# Patient Record
Sex: FEMALE | Race: WHITE | NOT HISPANIC OR LATINO | Employment: STUDENT | URBAN - METROPOLITAN AREA
[De-identification: names, ages, dates, MRNs, and addresses within clinical notes are randomized per-mention and may not be internally consistent; named-entity substitution may affect disease eponyms.]

---
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
## 2023-01-03 ENCOUNTER — TELEPHONE (OUTPATIENT)
Dept: FAMILY MEDICINE | Facility: CLINIC | Age: 18
End: 2023-01-03

## 2023-01-03 ENCOUNTER — MYC MEDICAL ADVICE (OUTPATIENT)
Dept: FAMILY MEDICINE | Facility: CLINIC | Age: 18
End: 2023-01-03

## 2023-01-03 ENCOUNTER — VIRTUAL VISIT (OUTPATIENT)
Dept: FAMILY MEDICINE | Facility: CLINIC | Age: 18
End: 2023-01-03
Payer: COMMERCIAL

## 2023-01-03 DIAGNOSIS — R30.0 DYSURIA: ICD-10-CM

## 2023-01-03 DIAGNOSIS — R10.2 PELVIC PAIN IN FEMALE: Primary | ICD-10-CM

## 2023-01-03 PROCEDURE — 99213 OFFICE O/P EST LOW 20 MIN: CPT | Mod: 95 | Performed by: NURSE PRACTITIONER

## 2023-01-03 NOTE — TELEPHONE ENCOUNTER
See telephone encounter from today, patient has a virtual visit scheduled with PCP this afternoon. Closing this encounter.    Luli Go RN  [facility]

## 2023-01-03 NOTE — TELEPHONE ENCOUNTER
"I would consider a urine culture a 'false negative\" if someone had taken 1 day of antibiotic prior to collection.  This is a true negative urine culture.  I would not recommend repeating a urine test.  Recommend to follow-up with PCP if concerns still exist  "

## 2023-01-03 NOTE — PROGRESS NOTES
Cherelle is a 17 year old who is being evaluated via a billable video visit.      How would you like to obtain your AVS? MyChart  If the video visit is dropped, the invitation should be resent by: Text to cell phone: [phone]  Will anyone else be joining your video visit? No          Assessment & Plan     Pelvic pain in female  Patient complains of a few week history of painful urination, hesitancy with lower abdominal/pelvic pain.  Previous history of urinary tract infections, [place] a few weeks ago, however was stopped due to no growth on urine culture.  Seen again within our system, diagnosed with constipation and urinary tract infection, started on Macrobid.  Bowel movements are normal, still having some intermittent lower abdominal pain and minimal burning with urination.  Most recent urine culture did not grow any bacteria, negative for infection.  Recommend patient repeat urinalysis and wet prep, has lab appointment on Thursday.  Will call patient with results and recommendations.  In the interim, recommend increasing fluid intake and monitoring symptoms.  If patient continues to have symptoms, may refer to urology.  - Wet prep - lab collect; Future  - UA Macro with Reflex to Micro and Culture - lab collect; Future    Dysuria  Burning on urination as above.  - UA Macro with Reflex to Micro and Culture - lab collect; Future             See Patient Instructions    Return in about 1 week (around 1/10/2023), or if symptoms worsen or fail to improve.    Sonia Reinoso, JOE, APRN-CNP   [facility]    Emelina Villarreal is a 17 year old, presenting for the following health issues:  Urinary Problem      HPI     Genitourinary - Female  Onset/Duration: a few weeks - last week started feeling really gross again. Lower abdominal pain mid abdomen  Description:   Painful urination (Dysuria): YES           Frequency: No  Blood in urine (Hematuria): No  Delay  in urine (Hesitency): YES  Intensity: mild  Progression of Symptoms:  improving  Accompanying Signs & Symptoms:  Fever/chills: No  Flank pain: YES but is much better  Nausea and vomiting: No is better now  Vaginal symptoms: none  Abdominal/Pelvic Pain: YES  History:   History of frequent UTI s: YES-   History of kidney stones: No  Sexually Active: YES  Possibility of pregnancy: No  Precipitating or alleviating factors: None  Therapies tried and outcome:  took a couple of dose of bactrim a couple weeks ago, went to a clinic in [place] but a couple days later went in again and abx was stopped because no infection.   On 12/29/2022 went to [facility] dx with constipation and given Macrobid for UTI.  Is still on Macrobid  Did not start medication for her bowels, said her bowels are normal now    Now having normal bowel movement's   Still having lower abdominal pain at times   Back pain is gone and stomach problems are better  Gets labs rechecked on Thursday   Still some minimal burning with peeing     Review of Systems   Constitutional, HEENT, cardiovascular, pulmonary, gi and gu systems are negative, except as otherwise noted.      Objective           Vitals:  No vitals were obtained today due to virtual visit.    Physical Exam   GENERAL: Healthy, alert and no distress  EYES: Eyes grossly normal to inspection.  No discharge or erythema, or obvious scleral/conjunctival abnormalities.  RESP: No audible wheeze, cough, or visible cyanosis.  No visible retractions or increased work of breathing.    SKIN: Visible skin clear. No significant rash, abnormal pigmentation or lesions.  NEURO: Cranial nerves grossly intact.  Mentation and speech appropriate for age.  PSYCH: Mentation appears normal, affect normal/bright, judgement and insight intact, normal speech and appearance well-groomed.    Diagnostic Test Results:  Labs reviewed in Epic  Pending          Video-Visit Details    Type of service:  Video Visit     Originating  Location (pt. Location): Home    Distant Location (provider location):  Off-site  Platform used for Video Visit: [employer]      Chart documentation with Dragon Voice recognition Software. Although reviewed after completion, some words and grammatical errors may remain.

## 2023-01-03 NOTE — TELEPHONE ENCOUNTER
Reason for Call: Lab Questions     Detailed comments: Pt had some Labs done and now has questions on the Urine Results.     Phone Number Patient can be reached at: Home number on file [phone] (home)    Best Time: Any Time      Can we leave a detailed message on this number? YES    Call taken on 1/3/2023 at 9:03 AM by Rosie Mendiola

## 2023-01-03 NOTE — TELEPHONE ENCOUNTER
Pt was notified. Pt says that her symptoms have improved a little, but that she's still having burning with urination and doesn't understand why that would be if her UC is negative. Writer suggests that she schedule a f/u visit with her PCP. She says her PCP is Sonia Reinoso NP, says that she's going back to school in [place] this weekend and would like to figure this out before then if possible. Writer suggests that she schedule a virtual visit with PCP this afternoon to discuss further, and she's in agreement with this plan. Call transferred to central scheduling to make an appointment.    Luli Go RN  [facility]

## 2023-01-03 NOTE — TELEPHONE ENCOUNTER
Dr. Trevino,    Patient and her family are concerned that this is a false negative urine culture and would like to repeat the process again.  Please advise. Anna ADAMS RN

## 2023-01-03 NOTE — RESULT ENCOUNTER NOTE
Even though the initial urine was suggestive of an infection, no single bacteria grew in culture, meaning no infection was truly present.  Antibiotic course is complete, so no further action is needed

## 2023-01-05 ENCOUNTER — LAB (OUTPATIENT)
Dept: LAB | Facility: CLINIC | Age: 18
End: 2023-01-05
Payer: COMMERCIAL

## 2023-01-05 DIAGNOSIS — R17 ELEVATED BILIRUBIN: ICD-10-CM

## 2023-01-05 DIAGNOSIS — R10.2 PELVIC PAIN IN FEMALE: ICD-10-CM

## 2023-01-05 DIAGNOSIS — R30.0 DYSURIA: ICD-10-CM

## 2023-01-05 LAB
ALBUMIN SERPL BCG-MCNC: 4.5 G/DL (ref 3.5–5.2)
ALBUMIN UR-MCNC: NEGATIVE MG/DL
ALP SERPL-CCNC: 62 U/L (ref 35–104)
ALT SERPL W P-5'-P-CCNC: 10 U/L (ref 10–35)
APPEARANCE UR: CLEAR
AST SERPL W P-5'-P-CCNC: 14 U/L (ref 10–35)
BILIRUB DIRECT SERPL-MCNC: <0.2 MG/DL (ref 0–0.3)
BILIRUB SERPL-MCNC: 0.7 MG/DL
BILIRUB UR QL STRIP: NEGATIVE
CLUE CELLS: NORMAL
COLOR UR AUTO: YELLOW
GLUCOSE UR STRIP-MCNC: NEGATIVE MG/DL
HGB UR QL STRIP: NEGATIVE
KETONES UR STRIP-MCNC: NEGATIVE MG/DL
LEUKOCYTE ESTERASE UR QL STRIP: ABNORMAL
NITRATE UR QL: NEGATIVE
PH UR STRIP: 6.5 [PH] (ref 5–7)
PROT SERPL-MCNC: 6.9 G/DL (ref 6.4–8.3)
RBC #/AREA URNS AUTO: ABNORMAL /HPF
SP GR UR STRIP: 1.02 (ref 1–1.03)
SQUAMOUS #/AREA URNS AUTO: ABNORMAL /LPF
TRICHOMONAS, WET PREP: NORMAL
UROBILINOGEN UR STRIP-ACNC: 0.2 E.U./DL
WBC #/AREA URNS AUTO: ABNORMAL /HPF
WBC'S/HIGH POWER FIELD, WET PREP: NORMAL
YEAST, WET PREP: NORMAL

## 2023-01-05 PROCEDURE — 36415 COLL VENOUS BLD VENIPUNCTURE: CPT

## 2023-01-05 PROCEDURE — 81001 URINALYSIS AUTO W/SCOPE: CPT

## 2023-01-05 PROCEDURE — 87210 SMEAR WET MOUNT SALINE/INK: CPT

## 2023-01-05 PROCEDURE — 80076 HEPATIC FUNCTION PANEL: CPT

## 2023-01-08 ENCOUNTER — NURSE TRIAGE (OUTPATIENT)
Dept: NURSING | Facility: CLINIC | Age: 18
End: 2023-01-08

## 2023-01-08 NOTE — TELEPHONE ENCOUNTER
"Patient has pain in upper left abdomin area and some in the lower left region down in the pelvis.    Patient states she has been having some pain for the last few weeks.  She has had some blood tests and urine tests.    Patient states the pain isnt better.  Patient states it is not constant but pain is occurring more than it is not.  Patient rates pain 4/10.    Patient is not weak, no confusion, hasnt passed out, no vomiting, no blood in stool.    Today patient has been urinating more often than normal.  She has also had 4 BM that is more than normal.  Her BMs have been formed, no blood in them.  Patient states she has nausea, fatigue and dry mouth.    Care advise: keep hydrated, bland diet, no alcohol, no caffeine, no fatty or greasy foods.  Can try kaopectate or pepto bismil.  Call back if any severe pain.  No appointments in clinic, will route.    Shelley Michael RN   01/08/23 4:48 PM  [facility] Nurse Advisor    Reason for Disposition    [1] MODERATE pain (e.g., interferes with normal activities) AND [2] pain comes and goes (cramps) AND [3] present > 24 hours  (Exception: pain with Vomiting or Diarrhea - see that Guideline)    Additional Information    Negative: Shock suspected (e.g., cold/pale/clammy skin, too weak to stand, low BP, rapid pulse)    Negative: Difficult to awaken or acting confused (e.g., disoriented, slurred speech)    Negative: Passed out (i.e., lost consciousness, collapsed and was not responding)    Negative: Sounds like a life-threatening emergency to the triager    Negative: Chest pain    Negative: Pain is mainly in upper abdomen  (if needed ask: \"is it mainly above the belly button?\")    Negative: Followed an abdomen (stomach) injury    Negative: [1] Abdominal pain AND [2] pregnant < 20 weeks    Negative: [1] Abdominal pain AND [2] pregnant 20 or more weeks    Negative: [1] Abdominal pain AND [2] postpartum (from 0 to 6 weeks after delivery)    Negative: [1] SEVERE pain (e.g., " "excruciating) AND [2] present > 1 hour    Negative: [1] SEVERE pain AND [2] age > 60 years    Negative: [1] Vomiting AND [2] contains red blood or black (\"coffee ground\") material  (Exception: few red streaks in vomit that only happened once)    Negative: Blood in bowel movements (Exception: blood on surface of BM with constipation)    Negative: Black or tarry bowel movements (Exception: chronic-unchanged black-grey bowel movements AND is taking iron pills or Pepto-bismol)    Negative: Patient sounds very sick or weak to the triager    Negative: [1] MILD-MODERATE pain AND [2] constant AND [3] present > 2 hours    Negative: [1] Vomiting AND [2] abdomen looks much more swollen than usual    Negative: [1] Vomiting AND [2] contains bile (green color)    Negative: White of the eyes have turned yellow (i.e., jaundice)    Negative: Fever > 103 F (39.4 C)    Negative: [1] Fever > 101 F (38.3 C) AND [2] age > 60 years    Negative: [1] Fever > 100.0 F (37.8 C) AND [2] bedridden (e.g., nursing home patient, CVA, chronic illness, recovering from surgery)    Negative: [1] Fever > 100.0 F (37.8 C) AND [2] diabetes mellitus or weak immune system (e.g., HIV positive, cancer chemo, splenectomy, organ transplant, chronic steroids)    Negative: [1] SEVERE pain AND [2] present < 1 hour    Protocols used: ABDOMINAL PAIN - FEMALE-A-AH      "

## 2023-01-09 ENCOUNTER — TELEPHONE (OUTPATIENT)
Dept: FAMILY MEDICINE | Facility: CLINIC | Age: 18
End: 2023-01-09

## 2023-01-09 NOTE — TELEPHONE ENCOUNTER
Please notify patient her urine and wet prep were normal, no infections.Given symptoms she should be seen in Urgent Care.    Thanks,  Sonia Reinoso, DNP, APRN-CNP

## 2023-01-09 NOTE — TELEPHONE ENCOUNTER
Pt is calling   She is living in [place]  She was in the ER yesterday- They did not do a CT because it wasn't an emergency  She got a liter of fluids and zofran    Per pt the ER dr told her she needs an order from her provider in [place] to have a abdominal CT  Writer is not sure if we would be able to do that    Educated pt about when to seek ER care  Pt verbalized understanding and agrees to the plan    Routing to PCP     Thank you  Cesilia Henriquez RN on 1/9/2023 at 8:19 AM

## 2023-01-09 NOTE — TELEPHONE ENCOUNTER
Reason for Call:  Other prescription    Detailed comments: Pt was in the ER 1/8/23 for Abdominal Pain. Pt was given IV Fluids.  Told to follow  Up today with Provider.   Pt is feeling nauseated and left pain is still the pain.   Pt said she is feeling more sick as time goes by.  Transferred pt to [facility].      Phone Number Patient can be reached at: Home number on file [phone]   (home)    Best Time: Any Time      Can we leave a detailed message on this number? YES    Call taken on 1/9/2023 at 8:06 AM by Rosie Mendiola

## 2023-01-10 NOTE — TELEPHONE ENCOUNTER
FERN Scott    Pt called back as she still is having abdominal pain in the upper left quad of abdomen.  She says sometimes she has discomfort in her right sided abdomen.  She has nausea off and on.  She said no chance she could be pregnant. Last period was 12/20.  BM are brown and usually everyday.  She is drinking a lot of fluids.  She was in an ER In [place] on 1/8 where she goes to college.  She said they did not do any imaging on her abdomen.    Pt was advised to go back to the ER if she has abdominal pain.  She is establishing with a primary care provider on Tuesday , near [facility] , in [place]  .

## 2023-01-16 ENCOUNTER — MYC MEDICAL ADVICE (OUTPATIENT)
Dept: FAMILY MEDICINE | Facility: CLINIC | Age: 18
End: 2023-01-16
Payer: COMMERCIAL

## 2023-01-24 NOTE — PATIENT INSTRUCTIONS
Pelvic pain in female  Patient complains of a few week history of painful urination, hesitancy with lower abdominal/pelvic pain.  Previous history of urinary tract infections[place] antibiotic a few weeks ago, however was stopped due to no growth on urine culture.  Seen again within our system, diagnosed with constipation and urinary tract infection, started on Macrobid.  Bowel movements are normal, still having some intermittent lower abdominal pain and minimal burning with urination.  Most recent urine culture did not grow any bacteria, negative for infection.  Recommend patient repeat urinalysis and wet prep, has lab appointment on Thursday.  Will call patient with results and recommendations.  In the interim, recommend increasing fluid intake and monitoring symptoms.  If patient continues to have symptoms, may refer to urology.  - Wet prep - lab collect; Future  - UA Macro with Reflex to Micro and Culture - lab collect; Future    Dysuria  Burning on urination as above.  - UA Macro with Reflex to Micro and Culture - lab collect; Future

## 2023-11-21 ENCOUNTER — PATIENT OUTREACH (OUTPATIENT)
Dept: CARE COORDINATION | Facility: CLINIC | Age: 18
End: 2023-11-21
Payer: COMMERCIAL

## 2023-12-05 ENCOUNTER — PATIENT OUTREACH (OUTPATIENT)
Dept: CARE COORDINATION | Facility: CLINIC | Age: 18
End: 2023-12-05
Payer: COMMERCIAL

## 2024-02-03 ENCOUNTER — HEALTH MAINTENANCE LETTER (OUTPATIENT)
Age: 19
End: 2024-02-03

## 2024-06-03 ENCOUNTER — OFFICE VISIT (OUTPATIENT)
Dept: FAMILY MEDICINE | Facility: CLINIC | Age: 19
End: 2024-06-03
Payer: COMMERCIAL

## 2024-06-03 VITALS
HEIGHT: 63 IN | WEIGHT: 117.4 LBS | HEART RATE: 76 BPM | DIASTOLIC BLOOD PRESSURE: 80 MMHG | OXYGEN SATURATION: 100 % | BODY MASS INDEX: 20.8 KG/M2 | SYSTOLIC BLOOD PRESSURE: 114 MMHG | TEMPERATURE: 97.6 F | RESPIRATION RATE: 14 BRPM

## 2024-06-03 DIAGNOSIS — J00 ACUTE RHINITIS: ICD-10-CM

## 2024-06-03 DIAGNOSIS — H69.91 DYSFUNCTION OF RIGHT EUSTACHIAN TUBE: Primary | ICD-10-CM

## 2024-06-03 DIAGNOSIS — Z11.3 SCREENING FOR STDS (SEXUALLY TRANSMITTED DISEASES): ICD-10-CM

## 2024-06-03 PROCEDURE — 99213 OFFICE O/P EST LOW 20 MIN: CPT | Performed by: NURSE PRACTITIONER

## 2024-06-03 PROCEDURE — 87591 N.GONORRHOEAE DNA AMP PROB: CPT | Performed by: NURSE PRACTITIONER

## 2024-06-03 PROCEDURE — 87491 CHLMYD TRACH DNA AMP PROBE: CPT | Performed by: NURSE PRACTITIONER

## 2024-06-03 ASSESSMENT — PAIN SCALES - GENERAL: PAINLEVEL: NO PAIN (0)

## 2024-06-03 ASSESSMENT — PATIENT HEALTH QUESTIONNAIRE - PHQ9
SUM OF ALL RESPONSES TO PHQ QUESTIONS 1-9: 0
SUM OF ALL RESPONSES TO PHQ QUESTIONS 1-9: 0
10. IF YOU CHECKED OFF ANY PROBLEMS, HOW DIFFICULT HAVE THESE PROBLEMS MADE IT FOR YOU TO DO YOUR WORK, TAKE CARE OF THINGS AT HOME, OR GET ALONG WITH OTHER PEOPLE: NOT DIFFICULT AT ALL

## 2024-06-03 NOTE — PATIENT INSTRUCTIONS
Dysfunction of right eustachian tube  Acute rhinitis  Patient presents today for continued ear discomfort, feeling like training, sometimes ringing in ears and itchiness.  Occasionally pain.  Does have some right-sided congestion.  Denies any fevers, no dizziness.  Fluid levels noted on right side without infection.  Recommend:  - Over-the-counter Flonase nasal spray, 2 sprays each nostril daily.  Reviewed proper administration instructions with patient  - Over-the-counter antihistamine such as Claritin or Zyrtec once daily  - over-the-counter antihistamine eardrop and administer per package directions  - Can trial nasal saline rinse such as Nolan pot if congestion worsens  - Continue for minimum of 2 weeks, if symptoms not improved, recommend recheck with provider.  Advised patient to notify care team and will get on provider schedule.

## 2024-06-03 NOTE — NURSING NOTE
"Chief Complaint   Patient presents with    Ear Problem     /80   Pulse 76   Temp 97.6  F (36.4  C) (Tympanic)   Resp 14   Ht 1.588 m (5' 2.5\")   Wt 53.3 kg (117 lb 6.4 oz)   LMP 05/20/2024   SpO2 100%   BMI 21.13 kg/m   Estimated body mass index is 21.13 kg/m  as calculated from the following:    Height as of this encounter: 1.588 m (5' 2.5\").    Weight as of this encounter: 53.3 kg (117 lb 6.4 oz).  Patient presents to the clinic using No DME      Health Maintenance that is potentially due pending provider review:    Health Maintenance Due   Topic Date Due    ADVANCE CARE PLANNING  Never done    HIV SCREENING  Never done    HEPATITIS C SCREENING  Never done    COVID-19 Vaccine (1 - 2023-24 season) Never done    ANNUAL REVIEW OF HM ORDERS  12/21/2023    CHLAMYDIA SCREENING  12/21/2023                  "

## 2024-06-03 NOTE — PROGRESS NOTES
Assessment & Plan     Dysfunction of right eustachian tube  Acute rhinitis  Patient presents today for continued ear discomfort, feeling like training, sometimes ringing in ears and itchiness.  Occasionally pain.  Does have some right-sided congestion.  Denies any fevers, no dizziness.  Fluid levels noted on right side without infection.  Recommend:  - Over-the-counter Flonase nasal spray, 2 sprays each nostril daily.  Reviewed proper administration instructions with patient  - Over-the-counter antihistamine such as Claritin or Zyrtec once daily  - over-the-counter antihistamine eardrop and administer per package directions  - Can trial nasal saline rinse such as Yamile pot if congestion worsens  - Continue for minimum of 2 weeks, if symptoms not improved, recommend recheck with provider.  Advised patient to notify care team and will get on provider schedule.    Screening for STDs (sexually transmitted diseases)  Patient is currently sexually active, will collect routine STD screening.  - Chlamydia trachomatis/Neisseria gonorrhoeae by PCR- VAGINAL SELF-SWAB; Future            See Patient Instructions  After discussion with patient, patient verbalizes and agreeable to receive AVS instructions via My Chart, not printed today     Emelina Villarreal is a 18 year old, presenting for the following health issues:  Ear Problem        6/3/2024     7:11 AM   Additional Questions   Roomed by Chloe CANDELARIO   Accompanied by Self         6/3/2024     7:11 AM   Patient Reported Additional Medications   Patient reports taking the following new medications .     History of Present Illness       Reason for visit:  Feels like ear keeps draining  Symptom onset:  More than a month  Symptoms include:  Ear feels like draining  Symptom intensity:  Mild  Symptom progression:  Staying the same  Had these symptoms before:  No  What makes it worse:  No ??  What makes it better:  No    She eats 2-3 servings of fruits and vegetables  "daily.She consumes 0 sweetened beverage(s) daily.She exercises with enough effort to increase her heart rate 30 to 60 minutes per day.  She exercises with enough effort to increase her heart rate 5 days per week.   She is taking medications regularly.     Feels like ear is draining \"nothing coming out\"  Had same issue last year was given ear drops but didn't help  Was seen again and nothing was wrong  Has a feeling something is in ear - very occ. Ear will hurt  Maybe has some allergies  Doesn't use any medication for this          Review of Systems  Constitutional, HEENT, cardiovascular, pulmonary, gi and gu systems are negative, except as otherwise noted.      Objective    /80   Pulse 76   Temp 97.6  F (36.4  C) (Tympanic)   Resp 14   Ht 1.588 m (5' 2.5\")   Wt 53.3 kg (117 lb 6.4 oz)   LMP 05/20/2024   SpO2 100%   BMI 21.13 kg/m    Body mass index is 21.13 kg/m .  Physical Exam   GENERAL: alert and no distress  EYES: Eyes grossly normal to inspection, PERRL and conjunctivae and sclerae normal  HENT: Left ear canal normal, TM with very small amount of fluid; right ear canal mildly irritated and TM with serous fluid, nose and mouth without ulcers or lesions, mild right nasal mucosal edema  NECK: no adenopathy, no asymmetry, masses, or scars  MS: no gross musculoskeletal defects noted, no edema  SKIN: no suspicious lesions or rashes  NEURO: Normal strength and tone, mentation intact and speech normal  PSYCH: mentation appears normal, affect normal/bright    Diagnostic Test Results:  Labs reviewed in Epic  Pending          Signed Electronically by: Uriel Garcia DNP,, APRN CNP      Chart documentation with Dragon Voice recognition Software. Although reviewed after completion, some words and grammatical errors may remain.   "

## 2024-06-04 LAB
C TRACH DNA SPEC QL PROBE+SIG AMP: NEGATIVE
N GONORRHOEA DNA SPEC QL NAA+PROBE: NEGATIVE

## 2024-07-24 ENCOUNTER — OFFICE VISIT (OUTPATIENT)
Dept: URGENT CARE | Facility: URGENT CARE | Age: 19
End: 2024-07-24
Payer: COMMERCIAL

## 2024-07-24 VITALS
DIASTOLIC BLOOD PRESSURE: 82 MMHG | BODY MASS INDEX: 21.06 KG/M2 | WEIGHT: 117 LBS | RESPIRATION RATE: 16 BRPM | OXYGEN SATURATION: 97 % | TEMPERATURE: 97.4 F | SYSTOLIC BLOOD PRESSURE: 128 MMHG | HEART RATE: 93 BPM

## 2024-07-24 DIAGNOSIS — N76.0 BACTERIAL VAGINOSIS: Primary | ICD-10-CM

## 2024-07-24 DIAGNOSIS — B96.89 BACTERIAL VAGINOSIS: Primary | ICD-10-CM

## 2024-07-24 DIAGNOSIS — R30.0 DYSURIA: ICD-10-CM

## 2024-07-24 LAB
ALBUMIN UR-MCNC: NEGATIVE MG/DL
APPEARANCE UR: CLEAR
BILIRUB UR QL STRIP: NEGATIVE
CLUE CELLS: PRESENT
COLOR UR AUTO: YELLOW
GLUCOSE UR STRIP-MCNC: NEGATIVE MG/DL
HGB UR QL STRIP: ABNORMAL
KETONES UR STRIP-MCNC: NEGATIVE MG/DL
LEUKOCYTE ESTERASE UR QL STRIP: NEGATIVE
NITRATE UR QL: NEGATIVE
PH UR STRIP: 7 [PH] (ref 5–7)
RBC #/AREA URNS AUTO: ABNORMAL /HPF
SP GR UR STRIP: 1.02 (ref 1–1.03)
SQUAMOUS #/AREA URNS AUTO: ABNORMAL /LPF
TRICHOMONAS, WET PREP: ABNORMAL
UROBILINOGEN UR STRIP-ACNC: 0.2 E.U./DL
WBC #/AREA URNS AUTO: ABNORMAL /HPF
WBC'S/HIGH POWER FIELD, WET PREP: ABNORMAL
YEAST, WET PREP: ABNORMAL

## 2024-07-24 PROCEDURE — 87210 SMEAR WET MOUNT SALINE/INK: CPT | Performed by: NURSE PRACTITIONER

## 2024-07-24 PROCEDURE — 99213 OFFICE O/P EST LOW 20 MIN: CPT | Performed by: NURSE PRACTITIONER

## 2024-07-24 PROCEDURE — 81001 URINALYSIS AUTO W/SCOPE: CPT | Performed by: NURSE PRACTITIONER

## 2024-07-24 RX ORDER — METRONIDAZOLE 500 MG/1
500 TABLET ORAL 2 TIMES DAILY
Qty: 14 TABLET | Refills: 0 | Status: SHIPPED | OUTPATIENT
Start: 2024-07-24 | End: 2024-07-31

## 2024-07-25 NOTE — PROGRESS NOTES
SUBJECTIVE:   Cherelle Rodriguez is a 19 year old female presenting with a chief complaint of   Chief Complaint   Patient presents with    Vaginal Problem     Evaluate for yeast infection, foul odor, itching and burning, pelvic pain x 1 day       Past Medical History:   Diagnosis Date    Allergic rhinitis, cause unspecified     Mild persistent asthma 2/18/2005    cough variant    Reflux esophagitis     Respiratory syncytial virus (RSV) 2004    Hospitalized x 3 days    Unspecified otitis media     12/04, 2/05     Family History   Problem Relation Age of Onset    C.A.D. Maternal Grandmother     Hypertension Maternal Grandmother     Allergies Brother     Respiratory Brother         Asthma    Breast Cancer Paternal Grandmother      Current Outpatient Medications   Medication Sig Dispense Refill    metroNIDAZOLE (FLAGYL) 500 MG tablet Take 1 tablet (500 mg) by mouth 2 times daily for 7 days 14 tablet 0     Social History     Tobacco Use    Smoking status: Never    Smokeless tobacco: Never    Tobacco comments:     non smoking home   Substance Use Topics    Alcohol use: Never       OBJECTIVE  /82   Pulse 93   Temp 97.4  F (36.3  C) (Tympanic)   Resp 16   Wt 53.1 kg (117 lb)   LMP 07/05/2024   SpO2 97%   BMI 21.06 kg/m      Physical Exam  Constitutional:       Appearance: Normal appearance.   Pulmonary:      Effort: Pulmonary effort is normal.   Skin:     General: Skin is warm and dry.   Neurological:      Mental Status: She is alert.         Labs:  Results for orders placed or performed in visit on 07/24/24 (from the past 24 hour(s))   Wet prep - Clinic Collect    Specimen: Vagina; Swab   Result Value Ref Range    Trichomonas Absent Absent    Yeast Absent Absent    Clue Cells Present (A) Absent    WBCs/high power field 2+ (A) None   UA Macroscopic with reflex to Microscopic and Culture - Clinic Collect    Specimen: Urine, Clean Catch   Result Value Ref Range    Color Urine Yellow Colorless, Straw, Light Yellow,  Yellow    Appearance Urine Clear Clear    Glucose Urine Negative Negative mg/dL    Bilirubin Urine Negative Negative    Ketones Urine Negative Negative mg/dL    Specific Gravity Urine 1.020 1.003 - 1.035    Blood Urine Small (A) Negative    pH Urine 7.0 5.0 - 7.0    Protein Albumin Urine Negative Negative mg/dL    Urobilinogen Urine 0.2 0.2, 1.0 E.U./dL    Nitrite Urine Negative Negative    Leukocyte Esterase Urine Negative Negative   UA Microscopic with Reflex to Culture   Result Value Ref Range    RBC Urine 5-10 (A) 0-2 /HPF /HPF    WBC Urine 0-5 0-5 /HPF /HPF    Squamous Epithelials Urine Few (A) None Seen /LPF    Narrative    Urine Culture not indicated           ASSESSMENT:      ICD-10-CM    1. Bacterial vaginosis  N76.0 metroNIDAZOLE (FLAGYL) 500 MG tablet    B96.89       2. Dysuria  R30.0 Wet prep - Clinic Collect     UA Macroscopic with reflex to Microscopic and Culture - Clinic Collect     UA Microscopic with Reflex to Culture               PLAN:    Patient Instructions   You currently have a vaginal infection called bacterial vaginosis, BV for short.   This is not a sexually transmitted infection and it cannot be transmitted to your partner.  BV typically occurs due to a change in pH balance of the vagina. The following things may cause BV to occur: douching, new soaps or lubricants, or oral sex. Sometimes we can't prevent BV because it can happen for no reason at all.   Sometimes BV is asymptomatic, but classically it causes thin or creamy odorous vaginal discharge. It can also cause some low abdominal discomfort.   Today we will treat this infection with a antibiotic called Metronidazole (Flagyl). Take this medication two times a day for 7 days. You must avoid drinking alcohol while you are taking this medication. If you drink while taking Flagyl you may experience severe headache, nausea/vomiting, or liver dysfunction.  Follow up if you continue to have symptoms after you have completed your treatment.

## 2024-08-20 ENCOUNTER — VIRTUAL VISIT (OUTPATIENT)
Dept: FAMILY MEDICINE | Facility: CLINIC | Age: 19
End: 2024-08-20
Payer: COMMERCIAL

## 2024-08-20 DIAGNOSIS — Z30.011 ENCOUNTER FOR INITIAL PRESCRIPTION OF CONTRACEPTIVE PILLS: Primary | ICD-10-CM

## 2024-08-20 PROCEDURE — 99214 OFFICE O/P EST MOD 30 MIN: CPT | Mod: 95 | Performed by: NURSE PRACTITIONER

## 2024-08-20 RX ORDER — NORGESTIMATE AND ETHINYL ESTRADIOL 7DAYSX3 LO
1 KIT ORAL DAILY
Qty: 84 TABLET | Refills: 0 | Status: SHIPPED | OUTPATIENT
Start: 2024-08-20

## 2024-08-20 NOTE — PATIENT INSTRUCTIONS
Encounter for initial prescription of contraceptive pills  Patient interested in birth control.  Reviewed R/B/A including abstinence, OCP, Patch, Nuvaring, Depo-Provera, IUD, Implanon and condoms.  Reviewed need for back-up contraception for the first month of hormonal methods. Reviewed that only abstinence and condoms provide protection from STD's. Denies hx of liver disease, migraine or thrombophlebitis   Denies fam hx of cancers or thrombophlebitis patient interested in starting oral contraceptive.  Will have patient schedule today for a urine pregnancy as she is currently sexually active.  Discussed start method, starting this Sunday after next period starts.  - HCG Qual, Urine ([id number]); Future  - norgestim-eth estrad triphasic (ORTHO TRI-CYCLEN LO) 0.18/0.215/0.25 MG-25 MCG tablet; Take 1 tablet by mouth daily

## 2024-08-20 NOTE — NURSING NOTE
"Chief Complaint   Patient presents with    Contraception       Initial LMP 08/12/2024 (Approximate)  Estimated body mass index is 21.06 kg/m  as calculated from the following:    Height as of 6/3/24: 1.588 m (5' 2.5\").    Weight as of 7/24/24: 53.1 kg (117 lb).    Patient presents to the clinic using No DME    Is there anyone who you would like to be able to receive your results? No  If yes have patient fill out AMANDA      "

## 2024-08-20 NOTE — PROGRESS NOTES
Cherelle is a 19 year old who is being evaluated via a billable video visit.    How would you like to obtain your AVS? MyChart  If the video visit is dropped, the invitation should be resent by: Text to cell phone: [phone]  Will anyone else be joining your video visit? No      Assessment & Plan     Encounter for initial prescription of contraceptive pills  Patient interested in birth control.  Reviewed R/B/A including abstinence, OCP, Patch, Nuvaring, Depo-Provera, IUD, Implanon and condoms.  Reviewed need for back-up contraception for the first month of hormonal methods. Reviewed that only abstinence and condoms provide protection from STD's. Denies hx of liver disease, migraine or thrombophlebitis   Denies fam hx of cancers or thrombophlebitis patient interested in starting oral contraceptive.  Will have patient schedule today for a urine pregnancy as she is currently sexually active.  Discussed start method, starting this Sunday after next period starts.  - HCG Qual, Urine ([id number]); Future  - norgestim-eth estrad triphasic (ORTHO TRI-CYCLEN LO) 0.18/0.215/0.25 MG-25 MCG tablet; Take 1 tablet by mouth daily            See Patient Instructions    Subjective   Cherelle is a 19 year old, presenting for the following health issues:  Contraception        8/20/2024     1:29 PM   Additional Questions   [facility]     Contraception     Pt would like to discuss birth control options.             Review of Systems  Constitutional, HEENT, cardiovascular, pulmonary, gi and gu systems are negative, except as otherwise noted.      Objective           Vitals:  No vitals were obtained today due to virtual visit.    Physical Exam   GENERAL: alert and no distress  EYES: Eyes grossly normal to inspection.  No discharge or erythema, or obvious scleral/conjunctival abnormalities.  RESP: No audible wheeze, cough, or visible cyanosis.    SKIN: Visible skin clear. No significant rash, abnormal pigmentation or  lesions.  NEURO: Cranial nerves grossly intact.  Mentation and speech appropriate for age.  PSYCH: Appropriate affect, tone, and pace of words    Diagnostic Test Results:  Pending        Video-Visit Details    Type of service:  Video Visit   Originating Location (pt. Location): Home    Distant Location (provider location):  Off-site  Platform used for Video Visit: [employer]  Signed Electronically by: Uriel Garcia DNP,, RAUL CNP      Chart documentation with Dragon Voice recognition Software. Although reviewed after completion, some words and grammatical errors may remain.

## 2024-08-21 ENCOUNTER — ALLIED HEALTH/NURSE VISIT (OUTPATIENT)
Dept: FAMILY MEDICINE | Facility: CLINIC | Age: 19
End: 2024-08-21
Payer: COMMERCIAL

## 2024-08-21 DIAGNOSIS — Z30.011 ENCOUNTER FOR INITIAL PRESCRIPTION OF CONTRACEPTIVE PILLS: ICD-10-CM

## 2024-08-21 LAB — HCG UR QL: NEGATIVE

## 2024-08-21 PROCEDURE — 99207 PR NO CHARGE NURSE ONLY: CPT

## 2024-08-21 PROCEDURE — 81025 URINE PREGNANCY TEST: CPT

## 2024-08-21 NOTE — PROGRESS NOTES
Patient presented for lab HcG.   Will receive results via [employer] Bailee Kahler on 8/21/2024 at 11:53 AM

## 2025-02-05 ENCOUNTER — TELEPHONE (OUTPATIENT)
Dept: FAMILY MEDICINE | Facility: CLINIC | Age: 20
End: 2025-02-05
Payer: COMMERCIAL

## 2025-02-05 NOTE — TELEPHONE ENCOUNTER
Patient Quality Outreach    Patient is due for the following:   Cervical Cancer Screening - PAP Needed    Action(s) Taken:   Schedule a Adult Preventative    Type of outreach:    Sent [employer] message.    Questions for provider review:    None           Ro Rivas CMA

## 2025-03-17 ENCOUNTER — PATIENT OUTREACH (OUTPATIENT)
Dept: CARE COORDINATION | Facility: CLINIC | Age: 20
End: 2025-03-17
Payer: COMMERCIAL

## 2025-03-31 ENCOUNTER — PATIENT OUTREACH (OUTPATIENT)
Dept: CARE COORDINATION | Facility: CLINIC | Age: 20
End: 2025-03-31
Payer: COMMERCIAL

## 2025-04-02 ENCOUNTER — TELEPHONE (OUTPATIENT)
Dept: FAMILY MEDICINE | Facility: CLINIC | Age: 20
End: 2025-04-02
Payer: COMMERCIAL

## 2025-04-02 NOTE — TELEPHONE ENCOUNTER
Patient Quality Outreach    Patient is due for the following:   Cervical Cancer Screening - PAP Needed    Action(s) Taken:   Patient was scheduled for PAP    Type of outreach:    Sent [employer] message.    Questions for provider review:    None         Ro Rivas CMA  Chart routed to None.

## 2025-06-26 ENCOUNTER — PATIENT OUTREACH (OUTPATIENT)
Dept: CARE COORDINATION | Facility: CLINIC | Age: 20
End: 2025-06-26
Payer: COMMERCIAL

## 2025-07-04 SDOH — HEALTH STABILITY: PHYSICAL HEALTH: ON AVERAGE, HOW MANY MINUTES DO YOU ENGAGE IN EXERCISE AT THIS LEVEL?: 40 MIN

## 2025-07-04 SDOH — HEALTH STABILITY: PHYSICAL HEALTH: ON AVERAGE, HOW MANY DAYS PER WEEK DO YOU ENGAGE IN MODERATE TO STRENUOUS EXERCISE (LIKE A BRISK WALK)?: 4 DAYS

## 2025-07-04 ASSESSMENT — SOCIAL DETERMINANTS OF HEALTH (SDOH): HOW OFTEN DO YOU GET TOGETHER WITH FRIENDS OR RELATIVES?: MORE THAN THREE TIMES A WEEK

## 2025-07-09 ENCOUNTER — OFFICE VISIT (OUTPATIENT)
Dept: FAMILY MEDICINE | Facility: CLINIC | Age: 20
End: 2025-07-09
Payer: COMMERCIAL

## 2025-07-09 VITALS
HEART RATE: 112 BPM | DIASTOLIC BLOOD PRESSURE: 80 MMHG | HEIGHT: 64 IN | TEMPERATURE: 99.1 F | OXYGEN SATURATION: 99 % | RESPIRATION RATE: 16 BRPM | WEIGHT: 125.4 LBS | SYSTOLIC BLOOD PRESSURE: 126 MMHG | BODY MASS INDEX: 21.41 KG/M2

## 2025-07-09 DIAGNOSIS — Z00.00 ROUTINE GENERAL MEDICAL EXAMINATION AT A HEALTH CARE FACILITY: Primary | ICD-10-CM

## 2025-07-09 DIAGNOSIS — G44.219 EPISODIC TENSION-TYPE HEADACHE, NOT INTRACTABLE: ICD-10-CM

## 2025-07-09 DIAGNOSIS — Z12.4 CERVICAL CANCER SCREENING: ICD-10-CM

## 2025-07-09 DIAGNOSIS — Z11.3 SCREENING FOR STDS (SEXUALLY TRANSMITTED DISEASES): ICD-10-CM

## 2025-07-09 LAB
C TRACH DNA SPEC QL PROBE+SIG AMP: NEGATIVE
N GONORRHOEA DNA SPEC QL NAA+PROBE: NEGATIVE
SPECIMEN TYPE: NORMAL

## 2025-07-09 PROCEDURE — G0145 SCR C/V CYTO,THINLAYER,RESCR: HCPCS | Performed by: NURSE PRACTITIONER

## 2025-07-09 PROCEDURE — 3079F DIAST BP 80-89 MM HG: CPT | Performed by: NURSE PRACTITIONER

## 2025-07-09 PROCEDURE — 99213 OFFICE O/P EST LOW 20 MIN: CPT | Mod: 25 | Performed by: NURSE PRACTITIONER

## 2025-07-09 PROCEDURE — 3074F SYST BP LT 130 MM HG: CPT | Performed by: NURSE PRACTITIONER

## 2025-07-09 PROCEDURE — 99395 PREV VISIT EST AGE 18-39: CPT | Performed by: NURSE PRACTITIONER

## 2025-07-09 PROCEDURE — 1126F AMNT PAIN NOTED NONE PRSNT: CPT | Performed by: NURSE PRACTITIONER

## 2025-07-09 PROCEDURE — 87591 N.GONORRHOEAE DNA AMP PROB: CPT | Performed by: NURSE PRACTITIONER

## 2025-07-09 PROCEDURE — 87491 CHLMYD TRACH DNA AMP PROBE: CPT | Performed by: NURSE PRACTITIONER

## 2025-07-09 RX ORDER — CYCLOBENZAPRINE HCL 5 MG
5 TABLET ORAL 2 TIMES DAILY PRN
Qty: 60 TABLET | Refills: 0 | Status: SHIPPED | OUTPATIENT
Start: 2025-07-09

## 2025-07-09 ASSESSMENT — PAIN SCALES - GENERAL: PAINLEVEL_OUTOF10: NO PAIN (0)

## 2025-07-09 NOTE — NURSING NOTE
"Chief Complaint   Patient presents with    Physical       Initial LMP 06/24/2025 (Exact Date)  Estimated body mass index is 21.06 kg/m  as calculated from the following:    Height as of 6/3/24: 1.588 m (5' 2.5\").    Weight as of 7/24/24: 53.1 kg (117 lb).    Patient presents to the clinic using No DME    Is there anyone who you would like to be able to receive your results? No  If yes have patient fill out AMANDA      "
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
## 2025-07-09 NOTE — PATIENT INSTRUCTIONS
Patient Education   Preventive Care Advice   This is general advice given by our system to help you stay healthy. However, your care team may have specific advice just for you. Please talk to your care team about your preventive care needs.  Nutrition  Eat 5 or more servings of fruits and vegetables each day.  Try wheat bread, brown rice and whole grain pasta (instead of white bread, rice, and pasta).  Get enough calcium and vitamin D. Check the label on foods and aim for 100% of the RDA (recommended daily allowance).  Lifestyle  Exercise at least 150 minutes each week  (30 minutes a day, 5 days a week).  Do muscle strengthening activities 2 days a week. These help control your weight and prevent disease.  No smoking.  Wear sunscreen to prevent skin cancer.  Have a dental exam and cleaning every 6 months.  Yearly exams  See your health care team every year to talk about:  Any changes in your health.  Any medicines your care team has prescribed.  Preventive care, family planning, and ways to prevent chronic diseases.  Shots (vaccines)   HPV shots (up to age 26), if you've never had them before.  Hepatitis B shots (up to age 59), if you've never had them before.  COVID-19 shot: Get this shot when it's due.  Flu shot: Get a flu shot every year.  Tetanus shot: Get a tetanus shot every 10 years.  Pneumococcal, hepatitis A, and RSV shots: Ask your care team if you need these based on your risk.  Shingles shot (for age 50 and up)  General health tests  Diabetes screening:  Starting at age 35, Get screened for diabetes at least every 3 years.  If you are younger than age 35, ask your care team if you should be screened for diabetes.  Cholesterol test: At age 39, start having a cholesterol test every 5 years, or more often if advised.  Bone density scan (DEXA): At age 50, ask your care team if you should have this scan for osteoporosis (brittle bones).  Hepatitis C: Get tested at least once in your life.  STIs (sexually  transmitted infections)  Before age 24: Ask your care team if you should be screened for STIs.  After age 24: Get screened for STIs if you're at risk. You are at risk for STIs (including HIV) if:  You are sexually active with more than one person.  You don't use condoms every time.  You or a partner was diagnosed with a sexually transmitted infection.  If you are at risk for HIV, ask about PrEP medicine to prevent HIV.  Get tested for HIV at least once in your life, whether you are at risk for HIV or not.  Cancer screening tests  Cervical cancer screening: If you have a cervix, begin getting regular cervical cancer screening tests starting at age 21.  Breast cancer scan (mammogram): If you've ever had breasts, begin having regular mammograms starting at age 40. This is a scan to check for breast cancer.  Colon cancer screening: It is important to start screening for colon cancer at age 45.  Have a colonoscopy test every 10 years (or more often if you're at risk) Or, ask your provider about stool tests like a FIT test every year or Cologuard test every 3 years.  To learn more about your testing options, visit:   .  For help making a decision, visit:   https://bit.ly/em47599.  Prostate cancer screening test: If you have a prostate, ask your care team if a prostate cancer screening test (PSA) at age 55 is right for you.  Lung cancer screening: If you are a current or former smoker ages 50 to 80, ask your care team if ongoing lung cancer screenings are right for you.  For informational purposes only. Not to replace the advice of your health care provider. Copyright   2023 [employer]. All rights reserved. Clinically reviewed by the [facility]. [employer] Exam Prep Platform 576257 - REV 01/24.

## 2025-07-14 LAB
BKR LAB AP GYN ADEQUACY: NORMAL
BKR LAB AP GYN INTERPRETATION: NORMAL
BKR LAB AP HPV REFLEX: NO
BKR LAB AP PREVIOUS ABNORMAL: NORMAL
PATH REPORT.COMMENTS IMP SPEC: NORMAL
PATH REPORT.COMMENTS IMP SPEC: NORMAL
PATH REPORT.RELEVANT HX SPEC: NORMAL